# Patient Record
Sex: MALE | HISPANIC OR LATINO | URBAN - METROPOLITAN AREA
[De-identification: names, ages, dates, MRNs, and addresses within clinical notes are randomized per-mention and may not be internally consistent; named-entity substitution may affect disease eponyms.]

---

## 2017-03-04 ENCOUNTER — EMERGENCY (EMERGENCY)
Facility: HOSPITAL | Age: 27
LOS: 0 days | Discharge: HOME | End: 2017-03-04
Attending: EMERGENCY MEDICINE | Admitting: EMERGENCY MEDICINE
Payer: COMMERCIAL

## 2017-03-04 VITALS
HEIGHT: 66 IN | DIASTOLIC BLOOD PRESSURE: 72 MMHG | HEART RATE: 95 BPM | SYSTOLIC BLOOD PRESSURE: 121 MMHG | RESPIRATION RATE: 18 BRPM | WEIGHT: 149.91 LBS | OXYGEN SATURATION: 100 %

## 2017-03-04 VITALS
HEART RATE: 88 BPM | TEMPERATURE: 98 F | DIASTOLIC BLOOD PRESSURE: 68 MMHG | OXYGEN SATURATION: 100 % | SYSTOLIC BLOOD PRESSURE: 126 MMHG | RESPIRATION RATE: 17 BRPM
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
## 2017-03-04 DIAGNOSIS — S62.309A UNSPECIFIED FRACTURE OF UNSPECIFIED METACARPAL BONE, INITIAL ENCOUNTER FOR CLOSED FRACTURE: ICD-10-CM

## 2017-03-04 DIAGNOSIS — M25.562 PAIN IN LEFT KNEE: ICD-10-CM

## 2017-03-04 DIAGNOSIS — S89.92XA UNSPECIFIED INJURY OF LEFT LOWER LEG, INITIAL ENCOUNTER: ICD-10-CM

## 2017-03-04 PROCEDURE — 72190 X-RAY EXAM OF PELVIS: CPT | Mod: 26

## 2017-03-04 PROCEDURE — 73130 X-RAY EXAM OF HAND: CPT | Mod: 26,LT

## 2017-03-04 PROCEDURE — 73110 X-RAY EXAM OF WRIST: CPT | Mod: 26,LT

## 2017-03-04 PROCEDURE — 99284 EMERGENCY DEPT VISIT MOD MDM: CPT

## 2017-03-04 PROCEDURE — 73030 X-RAY EXAM OF SHOULDER: CPT | Mod: 26,LT

## 2017-03-04 PROCEDURE — 73562 X-RAY EXAM OF KNEE 3: CPT | Mod: 26,LT

## 2017-03-04 RX ORDER — IBUPROFEN 200 MG
1 TABLET ORAL
Qty: 20 | Refills: 0 | OUTPATIENT
Start: 2017-03-04

## 2017-03-04 RX ORDER — IBUPROFEN 200 MG
600 TABLET ORAL ONCE
Qty: 0 | Refills: 0 | Status: COMPLETED | OUTPATIENT
Start: 2017-03-04 | End: 2017-03-04

## 2017-03-04 RX ADMIN — Medication 600 MILLIGRAM(S): at 12:00

## 2017-03-04 RX ADMIN — Medication 600 MILLIGRAM(S): at 10:37
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
## 2017-03-04 NOTE — ED PROVIDER NOTE - PROGRESS NOTE DETAILS
Seen by ortho team.  Placed in splint. Pt has knee pain.  XR shows no fracture.  Exam shows no deformities, abrasions, or swelling.  Likely soft tissue injury.  Pt given crutches. Ibuprofen prescribed for pain. RICE therapy.  ICE.  F/u with ortho. ortho consulted (Dr. Paredes and residents)  Seen by ortho team.  Placed in splint left hand splint for 5th metacarpal fracture.  F/u in a week with ortho

## 2017-03-04 NOTE — ED PROVIDER NOTE - CHPI ED SYMPTOMS NEG
no bruising/no back pain/no neck tenderness/no crying/no dizziness/no laceration/no loss of consciousness/no disorientation/no headache
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
## 2017-03-04 NOTE — ED PROVIDER NOTE - DETAILS:
I, Benji Espitia, performed the initial face to face bedside interview with this patient regarding history of present illness, review of symptoms and relevant past medical, social and family history.  I completed an independent physical examination.  I was the initial provider who evaluated this patient.  The history, relevant review of systems, past medical and surgical history, medical decision making, and physical examination was documented by the scribe in my presence and I attest to the accuracy of the documentation.

## 2017-03-04 NOTE — ED PROVIDER NOTE - NS ED MD SCRIBE ATTENDING SCRIBE SECTIONS
HISTORY OF PRESENT ILLNESS/PAST MEDICAL/SURGICAL/SOCIAL HISTORY/DISPOSITION/PHYSICAL EXAM/REVIEW OF SYSTEMS
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
## 2017-03-04 NOTE — ED PROVIDER NOTE - MUSCULOSKELETAL NEGATIVE STATEMENT, MLM
no back pain, no gout, no musculoskeletal pain, no neck pain, and no weakness. Pain to left knee, left shoulder, left hand and left wrist.
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
